# Patient Record
Sex: FEMALE | URBAN - METROPOLITAN AREA
[De-identification: names, ages, dates, MRNs, and addresses within clinical notes are randomized per-mention and may not be internally consistent; named-entity substitution may affect disease eponyms.]

---

## 2024-09-17 ENCOUNTER — TELEPHONE (OUTPATIENT)
Age: 69
End: 2024-09-17

## 2024-12-16 ENCOUNTER — TELEPHONE (OUTPATIENT)
Age: 69
End: 2024-12-16

## 2024-12-16 NOTE — TELEPHONE ENCOUNTER
Contacted patient off of Medication Management  to verify needs of services in attempts to offer patient an appointment. LVM for patient to contact intake dept  in regards to wait list

## 2024-12-18 NOTE — TELEPHONE ENCOUNTER
"Behavioral Health Outpatient Intake Questions    Referred By   :     Please advise interviewee that they need to answer all questions truthfully to allow for best care, and any misrepresentations of information may affect their ability to be seen at this clinic   => Was this discussed? Yes     Behavioral Health Outpatient Intake History -     Presenting Problem (in patient's own words): Trying to get back into certain plan and needs a psychiatrist.     Are there any communication barriers for this patient?     No                                               If yes, please describe barriers:   If there is a unique situation, please refer to [facility] Golden/Lakeisha Rubio for final determination.    Are you taking any psychiatric medications? Yes     If \"YES\" -What are they Paxil, Wellbutrin XL     If \"YES\" -Who prescribes? Pcp     Has the Patient previously received outpatient Talk Therapy or Medication Management from [facility]  No        If \"YES\"- When, Where and with Whom?         If \"NO\" -Has Patient received these services elsewhere?       If \"YES\" -When, Where, and with Whom?    Has the Patient abused alcohol or other substances in the last 6 months ? No  No concerns of substance abuse are reported.     If \"YES\" -What substance, How much, How often?     If illegal substance: Refer to [facility] (for [facility]) or [facility].   If Alcohol in excess of 10 drinks per week:  Refer to [facility] (for [facility]) or [facility]    Legal History-     Is this treatment court ordered? No   If \"yes \"send to :  Talk Therapy : Send to Bipin Golden for final determination   Med Management: Send to Dr. Rangel for final determination     Has the Patient been convicted of a felony?  No   If \"Yes\" send to -When, What?  Talk Therapy: Send to Bipin Golden for final determination   Med Management: Send to Dr. Rangel for final determination     ACCEPTED as a patient Yes  If \"Yes\" Appointment Date: 2/25 10:30AM Dr.Ilyas Ami MORGAN "     Referred Elsewhere?   If “Yes” - (Where? Ex: [facility], [facility], [facility], [facility], etc.)       Name of Insurance Co:[employer]   Insurance ID#[id number]  Insurance Phone #  If ins is primary or secondary?  If patient is a minor, parents information such as Name, D.O.B of guarantor.

## 2024-12-23 ENCOUNTER — TELEPHONE (OUTPATIENT)
Dept: PSYCHIATRY | Facility: CLINIC | Age: 69
End: 2024-12-23

## 2024-12-23 NOTE — TELEPHONE ENCOUNTER
No MyChart.  Paperwork placed in outgoing mail.    LVM requesting that paperwork be completed prior to appt.

## 2025-02-11 ENCOUNTER — TELEPHONE (OUTPATIENT)
Dept: PSYCHIATRY | Facility: CLINIC | Age: 70
End: 2025-02-11

## 2025-02-11 NOTE — TELEPHONE ENCOUNTER
Left voicemail. Appointment with Dr. Hawley cancelled. He will not be in the office on March 25th, 2025.

## 2025-02-19 ENCOUNTER — TELEPHONE (OUTPATIENT)
Age: 70
End: 2025-02-19

## 2025-02-19 NOTE — TELEPHONE ENCOUNTER
Patient called in for directions to the PF from [place].     Patient was successfully transferred to the PF for further assistance.

## 2025-02-21 ENCOUNTER — TELEPHONE (OUTPATIENT)
Age: 70
End: 2025-02-21

## 2025-02-21 NOTE — TELEPHONE ENCOUNTER
Patient called in to inquire if her copay could be billed to her.    Writer checked with billing and was directed to ask the providers office, if this was permitted for .    Writer spoke with PF clerical, and was advised that this is permitted.    Patient follow up appointment was also established for 1 month.

## 2025-02-24 ENCOUNTER — TELEPHONE (OUTPATIENT)
Age: 70
End: 2025-02-24

## 2025-02-24 NOTE — TELEPHONE ENCOUNTER
Patient called and requested to reschedule new pt. Appointment and follow-up appointment. Scheduled for 5/19 and 6/23.

## 2025-03-04 ENCOUNTER — TELEPHONE (OUTPATIENT)
Dept: PSYCHIATRY | Facility: CLINIC | Age: 70
End: 2025-03-04

## 2025-03-04 NOTE — TELEPHONE ENCOUNTER
No MyChart.  Paperwork placed in outgoing mail.    LVM requesting that forms be completed prior to appt.

## 2025-05-15 ENCOUNTER — TELEPHONE (OUTPATIENT)
Age: 70
End: 2025-05-15

## 2025-05-15 NOTE — TELEPHONE ENCOUNTER
Patient is calling regarding cancelling an appointment.    Date/Time: 5/19/2025 at 1 pm - New patient appt                    6/23/2025 at 11 :30 am follow up    Reason: Doesn't have copay money    Patient was rescheduled: YES [x] NO []  If yes, when was Patient reschedule for: 6/24/2025 at 10 ;30 am Np  Follow up- 7/22/2024 at 1 pm f/u    Patient requesting call back to reschedule: YES [] NO [x]

## 2025-06-24 ENCOUNTER — OFFICE VISIT (OUTPATIENT)
Dept: PSYCHIATRY | Facility: CLINIC | Age: 70
End: 2025-06-24
Payer: COMMERCIAL

## 2025-06-24 DIAGNOSIS — F33.41 RECURRENT MAJOR DEPRESSIVE DISORDER, IN PARTIAL REMISSION (HCC): Primary | ICD-10-CM

## 2025-06-24 PROCEDURE — 90792 PSYCH DIAG EVAL W/MED SRVCS: CPT | Performed by: STUDENT IN AN ORGANIZED HEALTH CARE EDUCATION/TRAINING PROGRAM

## 2025-06-24 RX ORDER — BUPROPION HYDROCHLORIDE 300 MG/1
300 TABLET ORAL DAILY
COMMUNITY
Start: 2025-04-07 | End: 2025-06-24 | Stop reason: SDUPTHER

## 2025-06-24 RX ORDER — PAROXETINE 30 MG/1
30 TABLET, FILM COATED ORAL DAILY
Qty: 90 TABLET | Refills: 0 | Status: SHIPPED | OUTPATIENT
Start: 2025-06-24 | End: 2025-09-22

## 2025-06-24 RX ORDER — PAROXETINE 30 MG/1
30 TABLET, FILM COATED ORAL DAILY
COMMUNITY
Start: 2025-02-26 | End: 2025-06-24 | Stop reason: SDUPTHER

## 2025-06-24 RX ORDER — BUPROPION HYDROCHLORIDE 300 MG/1
300 TABLET ORAL EVERY MORNING
Qty: 90 TABLET | Refills: 0 | Status: SHIPPED | OUTPATIENT
Start: 2025-06-24 | End: 2025-09-22

## 2025-06-24 NOTE — PSYCH
"Client Name: Rossana García       Client YOB: 1955  : 1955    Treatment Team:     Psychiatrist: Kay Hawley DO      Healthcare Provider  No primary care provider on file.  No primary provider on file.        Plan Type: adolescent/adult (14 and over) Initial    My Personal Strengths are (in the client's own words):  \"Kind, empathetic\"    The stressors and triggers that may put me at risk are:  Medical conditions    Coping skills I can use to keep myself calm and safe:  Listen music, watch TV    Coping skills/supports I can use to maintain abstinence from substance use:   Not Applicable    The people that provide me with help and support: (Include name, contact, and how they can help)    Support Persons #1:   *Extended Emergency Contact Information  Primary Emergency Contact: Millie Suárez   [place]  Work Phone: [phone]  Relation: Relative   *How they can help me? \"Emotional support\"        If it is an emergency and you need immediate help, call     If there is a possibility of danger to yourself or others, call the following crisis hotline resources:     Adult Crisis Numbers  Suicide Prevention Hotline - Dial   [facility]: [phone]  [facility]: [phone]  [facility]: [phone]  [facility]: [phone]  [facility]: [phone]  [facility]: [phone]  [facility]: [phone]  [facility]: [phone] (daytime).       [phone] (after hours, weekends, holidays)     Child/Adolescent Crisis Numbers   [facility]: [phone]   [facility]: [phone]   [facility]: [phone]   [facility]: [phone]    Please note: Some [facility] do not have a separate number for Child/Adolescent specific crisis. If your county is not listed under Child/Adolescent, please call the adult number for your county     National Talk to Text Line   All Ages - [phone]    In " the event your feelings become unmanageable, and you cannot reach your support system, you will call 911 immediately or go to the nearest hospital emergency room. If you have any physical or medical emergency or feel as if you are in physical/medical distress, call 911 immediately or go to the nearest hospital emergency room.

## 2025-06-27 NOTE — H&P
PSYCHIATRIC EVALUATION     [facility]    Name and Date of Birth:  Rossana García 69 y.o. 1955 MRN: [id number]    Date of Visit: 2025    Reason for visit: Full psychiatric intake assessment for medication management     HPI     Rossana García is a 69 y.o. female with a past psychiatric history significant for MDD, CHRIS who presents to the [facility] outpatient clinic for intake assessment. Rossana presents as a new patient for this physician.    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 Currently, she experiences persistent fatigue, chronic difficulty swallowing and chewing medications, and has multiple medical issues including arthritis, diabetes, and hypertension. Rossana was considered medically unemployable due to her health issues, which led to her longterm.  She has been on Wellbutrin XL since a hospital admission at [place] in 2021, where she was admitted to the psych benitez after expressing apathy and little interest or pleasure in activities. During this time, she was not taking her hypertensive or diabetic medications and felt apathetic, which led to her friend expressing concern that she might not care if she . Rossana denies any thoughts of self-harm or suicide.  She denies any history of HI, AVH, delusions, eloy    Rossana has been prescribed Paxil and Wellbutrin XL by her primary care doctor. She has been on different doses of Paxil in the past and is currently taking 30 mg. She feels that Wellbutrin was helpful during her hospital stay, and she is currently on 150 mg XL.     Rossana experiences insomnia, which has been an issue since the  when she was still working. She reports having ruminating thoughts but does not worry about things as badly as she used to. Her sleep is often disrupted by pain and other medical issues,  including arthritis, diabetes, hypertension, fluid retention, and possible peripheral arterial disease. She lives in a handicapped apartment in a senior living community and spends most of her time with her legs elevated due to swelling. Rossana reports feeling tired and not accomplishing much, and she receives meals through [employer] services but does not always prepare them.    Rossana denies any history of substance abuse. She has experienced bullying due to her father's profession as a  and her weight, which she describes as an unpleasant aspect of her upbringing. Rossana reports no history of mental health illnesses in her family.     After discussion of risks, benefits, potential side effects, alternatives, we will continue on current regimen as is without any changes as patient believes that overall, she is feeling well and simply wanted a evaluation from a psychiatrist rather than a nonspecialist.  She will continue to follow with her other medical specialists to alleviate her chronic medical issues which contribute significantly to her mood.  She denies acute mental health complaints or concerns at this time        Current Rating Scores:     Current PHQ-9   PHQ-2/9 Depression Screening    Little interest or pleasure in doing things: 0 - not at all  Feeling down, depressed, or hopeless: 0 - not at all       Current CHRIS-7 is   CHRIS-7 Flowsheet Screening      Flowsheet Row Most Recent Value   Over the last two weeks, how often have you been bothered by the following problems?     Feeling nervous, anxious, or on edge 0   CHRIS Score  0        .    Psychiatric Review Of Systems:    Sleep changes: yes  Appetite changes: no  Weight changes: unknown  Energy/anergy: no  Interest/pleasure/anhedonia: no  Somatic symptoms: no  Anxiety/panic: no  Michelle: no  Guilty/hopeless: no  Self injurious behavior/risky behavior: no  Suicidal ideation: no  Homicidal ideation: no  Auditory hallucinations: no  Visual hallucinations:  no  Other hallucinations: no  Delusional thinking: no  Eating disorder history: no  Obsessive/compulsive symptoms: no    Review Of Systems:    Constitutional negative   ENT negative   Cardiovascular negative   Respiratory negative   Gastrointestinal negative   Genitourinary negative   Musculoskeletal joint pain   Integumentary negative   Neurological neuropathic pain   Endocrine negative   Other Symptoms none, all other systems are negative       Past psychiatric history  - Inpatient psychiatric admissions: Admitted to inpatient psychiatry in October 2021.  - Prior outpatient psychiatric linkage: None mentioned.  - Past/current psychotherapy: None mentioned.  - History of suicidal attempts/thoughts/gestures: Denies any history.  - Psychotropic medication trials/experiences: Wellbutrin XL, Paxil, Abilify, Lexapro.  - Substance abuse inpatient/outpatient rehabilitation: None mentioned.  - Substance use history: Does not drink alcohol or smoke cigarettes.    Family history  - No known mental health illnesses    Social history  - Early Developmental: Not mentioned.  - Education: Graduated from high school, attended a year of nursing school.  - Marital history: Not mentioned.  - Living arrangement, social support: Lives in a handicapped apartment in a senior living community.  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  - Access to firearms: None reported  - Abuse: Bullied due to father's profession and weight.  - Other traumatic events: None mentioned.    Past Medical History:    Past Medical History[1]     Past Surgical History[2]  Allergies[3]    History Review:    The following portions of the patient's history were reviewed and updated as appropriate: allergies, current medications, past family history, past medical history, past social history, past surgical history, and problem list.    OBJECTIVE:    Vital signs in last 24 hours:    There were no vitals filed for this  visit.    Mental Status Evaluation:    Appearance age appropriate, casually dressed   Behavior cooperative, calm   Speech normal rate, normal volume, normal pitch   Mood normal   Affect normal range and intensity, appropriate   Thought Processes organized, goal directed   Associations intact associations   Thought Content no overt delusions   Perceptual Disturbances: no auditory hallucinations, no visual hallucinations   Abnormal Thoughts  Risk Potential Suicidal ideation - None  Homicidal ideation - None  Potential for aggression - No   Orientation oriented to person, place, time/date, and situation   Memory recent and remote memory grossly intact   Consciousness alert and awake   Attention Span Concentration Span attention span and concentration are age appropriate   Intellect appears to be of average intelligence   Insight intact   Judgement intact   Muscle Strength and  Gait uses walker   Motor Activity no abnormal movements   Language no difficulty naming common objects, no difficulty repeating a phrase   Fund of Knowledge adequate knowledge of current events  adequate fund of knowledge regarding past history  adequate fund of knowledge regarding vocabulary    Pain mild   Pain Scale 3       Laboratory Results: I have personally reviewed all pertinent laboratory/tests results    Recent Labs (last 2 months):   No visits with results within 2 Month(s) from this visit.   Latest known visit with results is:   No results found for any previous visit.       Suicide/Homicide Risk Assessment:    Risk of Harm to Self:  The following ratings are based on assessment at the time of the interview  Historical Risk Factors include: chronic psychiatric problems  Protective Factors: no current suicidal ideation, access to mental health treatment, having a desire to be alive    Risk of Harm to Others:  The following ratings are based on assessment at the time of the interview  Historical Risk Factors include: none.  Protective  Factors: no current homicidal ideation, access to mental health treatment    The following interventions are recommended: continue medication management, contracts for safety at present - agrees to go to ED if feeling unsafe, contracts for safety at present - agrees to call Crisis Intervention Service if feeling unsafe. Although patient's acute lethality risk is LOW, long-term/chronic lethality risk is mildly elevated given historical mental health diagoses. However, at the current moment, Rossana is future-oriented, forward-thinking, and demonstrates ability to act in a self-preserving manner as evidenced by volitionally presenting to the appointment today, seeking treatment. Additionally, Rossana's responses suggest a will and desire to live. At this juncture, inpatient hospitalization is not currently warranted. To mitigate future risk, patient should adhere to treatment recommendations, avoid alcohol/illicit substance use, utilize community-based resources and familiar support, and prioritize mental health treatment.     DSM-V Diagnoses:     1.)  MDD, recurrent, in partial remission  2.)   3.)     Assessment/Plan:     After discussion of risks, benefits, potential side effects, alternatives, we will continue on current regimen as is without any changes due to patient preference and reported effectiveness.  She will continue Wellbutrin extended release 300 mg every morning, paroxetine 30 mg nightly.  She denies acute mental health complaints or concerns at this time and will continue to follow with her other medical specialists for treatment of her chronic medical conditions including her pain      Today's Plan/Medical Decision Making:    Psychopharmacologically, I spoke at length with Rossana about the bio-psycho-social approach to treatment and avenues for intervention. I stressed the importance of making better dietary choices, expanding exercise regimen, and reestablishing a sense of purpose and connectivity in  life. I also emphasized the importance of establishing care with the primary care physician along with specialists relevant to their medical diagnoses to which the patient voiced understanding and agreement.        Treatment Recommendations/Precautions:        Does not want any medication changes  Aware of 24 hour and weekend coverage for urgent situations accessed by calling [facility] main practice number    Patient voiced understanding and agreement to call 911 or head to the nearest emergency room should they experience any physical decompensation whatsoever including but not limited to the red flag signs and symptoms of fevers, chills, chest pains, nausea, vomiting, dizziness, changes in vision, trouble breathing.  Patient was also offered the contact information of their local [facility] crisis hotline and voiced understanding and agreement to call it or [phone] or head to nearest emergency department immediately should they experience any mental health decompensation whatsoever including but not limited to SI, HI, increasing AVH, eloy.     Medications Risks/Benefits:      Risks, Benefits And Possible Side Effects Of Medications:    Risks, benefits, and possible side effects of medications explained to Rossana and she verbalizes understanding and agreement for treatment.    Controlled Medication Discussion:     Not applicable - controlled prescriptions are not prescribed by this practice    Treatment Plan:    Completed and signed during the session: We will complete at next appointment due to lack of time today      Visit Time    Visit Start Time: 10:30 AM  Visit Stop Time: 11:25 AM  Total Visit Duration: 55 minutes     The total visit duration detailed above includes: patient engagement, medication management, psychotherapy/counseling, discussion regarding treatment goals, documentation, review of past medical records, and coordination of care.      Note Share Disclaimer:     This note was  not shared with the patient due to reasonable likelihood of causing patient harm    Kay Hawley DO  06/30/25         [1] No past medical history on file.  [2] No past surgical history on file.  [3] Not on File

## 2025-07-16 ENCOUNTER — TELEPHONE (OUTPATIENT)
Age: 70
End: 2025-07-16

## 2025-07-16 NOTE — TELEPHONE ENCOUNTER
The patient called in to inquire if there was an SANDRA on file for her. The writer verified there was no SANDRA on file, and that the patient can come into the office to sign one at the .    The patient will also be dropping off paperwork, that she took home to complete.     The patient advised she is still taking her medications, at this time.

## 2025-07-29 ENCOUNTER — OFFICE VISIT (OUTPATIENT)
Age: 70
End: 2025-07-29

## 2025-07-29 VITALS
TEMPERATURE: 98 F | DIASTOLIC BLOOD PRESSURE: 60 MMHG | RESPIRATION RATE: 18 BRPM | WEIGHT: 250 LBS | HEIGHT: 67 IN | HEART RATE: 80 BPM | BODY MASS INDEX: 39.24 KG/M2 | SYSTOLIC BLOOD PRESSURE: 90 MMHG | OXYGEN SATURATION: 98 %

## 2025-07-29 DIAGNOSIS — L03.011 PARONYCHIA OF FINGER OF RIGHT HAND: Primary | ICD-10-CM

## 2025-07-29 PROCEDURE — PBNCHG PB NO CHARGE PLACEHOLDER: Performed by: EMERGENCY MEDICINE

## 2025-07-29 RX ORDER — POTASSIUM CHLORIDE 750 MG/1
10 TABLET, EXTENDED RELEASE ORAL 2 TIMES DAILY
COMMUNITY

## 2025-07-29 RX ORDER — ARIPIPRAZOLE 5 MG/1
1 TABLET ORAL DAILY
COMMUNITY

## 2025-07-29 RX ORDER — FEXOFENADINE HCL 180 MG/1
180 TABLET ORAL DAILY
COMMUNITY

## 2025-07-29 RX ORDER — FUROSEMIDE 40 MG/1
TABLET ORAL
COMMUNITY
Start: 2025-02-27

## 2025-07-29 RX ORDER — CEPHALEXIN 500 MG/1
500 CAPSULE ORAL EVERY 12 HOURS SCHEDULED
Qty: 10 CAPSULE | Refills: 0 | Status: SHIPPED | OUTPATIENT
Start: 2025-07-29 | End: 2025-08-03

## 2025-07-29 RX ORDER — CHLORAL HYDRATE 500 MG
1000 CAPSULE ORAL DAILY
COMMUNITY

## 2025-07-29 RX ORDER — OLMESARTAN MEDOXOMIL 20 MG/1
10 TABLET ORAL DAILY
COMMUNITY
Start: 2025-02-26

## 2025-07-29 RX ORDER — ROSUVASTATIN CALCIUM 40 MG/1
40 TABLET, COATED ORAL DAILY
COMMUNITY
Start: 2025-03-11

## 2025-07-29 RX ORDER — PANTOPRAZOLE SODIUM 40 MG/1
40 TABLET, DELAYED RELEASE ORAL DAILY
COMMUNITY
Start: 2025-05-08

## 2025-07-29 RX ORDER — FAMOTIDINE 40 MG/1
40 TABLET, FILM COATED ORAL
COMMUNITY

## 2025-07-29 RX ORDER — NYSTATIN 100000 [USP'U]/G
POWDER TOPICAL 2 TIMES DAILY
COMMUNITY
Start: 2025-02-26

## 2025-07-29 RX ORDER — LORATADINE 10 MG/1
10 TABLET ORAL DAILY
COMMUNITY

## 2025-08-14 ENCOUNTER — TELEPHONE (OUTPATIENT)
Dept: PSYCHIATRY | Facility: CLINIC | Age: 70
End: 2025-08-14